# Patient Record
(demographics unavailable — no encounter records)

---

## 2024-11-15 NOTE — PHYSICAL EXAM
[General Appearance - Well Developed] : well developed [General Appearance - Well Nourished] : well nourished [Normal Appearance] : normal appearance [Well Groomed] : well groomed [General Appearance - In No Acute Distress] : no acute distress [] : no respiratory distress [Exaggerated Use Of Accessory Muscles For Inspiration] : no accessory muscle use [Urethral Meatus] : meatus normal [Penis Abnormality] : normal circumcised penis [Scrotum] : the scrotum was normal [Epididymis] : the epididymides were normal [Testes Tenderness] : no tenderness of the testes [Testes Mass (___cm)] : there were no testicular masses [Normal Station and Gait] : the gait and station were normal for the patient's age [Oriented To Time, Place, And Person] : oriented to person, place, and time [Affect] : the affect was normal [de-identified] : 15cc testicles bilaterally

## 2024-11-15 NOTE — HISTORY OF PRESENT ILLNESS
[FreeTextEntry1] : 39M [place] speaking, presents for erectile dysfunction, referred by wife's gynecologist (Dr. Kandace Alvarez) 26F wife, 4 prior miscarriages, "uterine abscess," most recent miscarriage last year 6/2023, currently trying again. Plan for IUI next month "normal" SA 7 years ago  ED for 2-3 weeks Previous erections 6-7/10 Last week was able to achieve an adequate erection, yesterday without success No change in libido No painful erections Has not tried any medications  PMH: DM (recent HgbA1c 6.5%), HLD PSH: none Allergies: none Fam hx: no  malignancy Social hx: , non smoker, no EtOH

## 2024-11-15 NOTE — ASSESSMENT
[FreeTextEntry1] : 39M with DM, HLD, presents for ED  T, LH, FSH, E2 SA Trial Sildenafil 100mg PRN resutls by phone needs [place]